# Patient Record
(demographics unavailable — no encounter records)

---

## 2024-11-20 NOTE — HISTORY OF PRESENT ILLNESS
[FreeTextEntry1] : 24 y/o presents for STI screening. Recently got out of a relationship, otherwise no symptoms or exposures, but feels as though her pH is off.

## 2024-11-20 NOTE — PHYSICAL EXAM
[Chaperone Present] : A chaperone was present in the examining room during all aspects of the physical examination [15238] : A chaperone was present during the pelvic exam. [FreeTextEntry2] : Sav PRADO [Appropriately responsive] : appropriately responsive [Alert] : alert [No Acute Distress] : no acute distress [Regular Rate Rhythm] : regular rate rhythm [Oriented x3] : oriented x3 [Labia Majora] : normal [Labia Minora] : normal [Normal] : normal

## 2024-11-20 NOTE — PLAN
[FreeTextEntry1] : 22 y/o for STI screening  Plan: - Vaginitis panel, STD screening - will f/u results